# Patient Record
Sex: FEMALE
[De-identification: names, ages, dates, MRNs, and addresses within clinical notes are randomized per-mention and may not be internally consistent; named-entity substitution may affect disease eponyms.]

---

## 2021-08-27 PROBLEM — Z00.00 ENCOUNTER FOR PREVENTIVE HEALTH EXAMINATION: Status: ACTIVE | Noted: 2021-08-27

## 2021-08-31 ENCOUNTER — APPOINTMENT (OUTPATIENT)
Dept: ORTHOPEDIC SURGERY | Facility: CLINIC | Age: 41
End: 2021-08-31
Payer: COMMERCIAL

## 2021-08-31 PROCEDURE — 99203 OFFICE O/P NEW LOW 30 MIN: CPT | Mod: 95

## 2021-08-31 NOTE — REASON FOR VISIT
[Home] : at home, [unfilled] , at the time of the visit. [Medical Office: ([facility])___] : at the medical office located in  [Verbal consent obtained from patient] : the patient, [unfilled] [Initial Visit] : an initial visit for [Wrist Injury] : wrist injury

## 2021-08-31 NOTE — HISTORY OF PRESENT ILLNESS
[Right] : right hand dominant [FreeTextEntry1] : Pt c/o right ulna sided wrist pain.  She had x rays and MRI done at  Radiology.  Pt was no injected.  She tried rest and splinting with minimal relief.  \par \par Patient developed right ulnar-sided wrist pain with tennis approximately 8 weeks ago. He was seen by an outside orthopedist, who diagnosed her with a TFCC tear recommended splinting and activity modifications.\par \par She states that over the past 8 weeks with the splinting and activity modification. Symptoms are 99% improved and she denies any mechanical symptoms.\par \par She has questions about the need for an MRI and the differential diagnosis was. Options.

## 2021-08-31 NOTE — ASSESSMENT
[FreeTextEntry1] : I reviewed the notes from Dr. Gayle , and discussed with her the limited ability to assess things over the phone. She did not have access to the usual communication today.\par \par The risks, benefits, alternatives and associated differential diagnosis was discussed with the patient. Options ranged from conservative care to surgical intervention were reviewed and all questions answered. Patient appeared to have an excellent understanding of the risks as well as differential diagnosis associated with this condition.\par \par Since symptoms are 99% better with splinting. She elected to continue splinting and home program and will follow up with an outside therapist to reduce the risk of reinjury.\par \par If she does reinjure it or the symptoms do not resolve over the next several weeks. She will followup in my office for reevaluation and consideration of an injection versus an MRI.\par \par If the clinical and physical exam is apparent for a TFCC injury. An MRI would most likely not be obtained and we would proceed with a injection.\par \par It is hoped that splinting and activity modifications, resolve the symptoms with the therapist, but if they do not resolve or she reinjures the area. She will return to the office for reevaluation. If symptoms resolve. She can follow on an as-needed basis.

## 2021-11-23 ENCOUNTER — APPOINTMENT (OUTPATIENT)
Dept: ORTHOPEDIC SURGERY | Facility: CLINIC | Age: 41
End: 2021-11-23
Payer: COMMERCIAL

## 2021-11-23 VITALS — BODY MASS INDEX: 24.8 KG/M2 | WEIGHT: 140 LBS | HEIGHT: 63 IN

## 2021-11-23 PROCEDURE — 99213 OFFICE O/P EST LOW 20 MIN: CPT | Mod: 25

## 2021-11-23 PROCEDURE — 73110 X-RAY EXAM OF WRIST: CPT | Mod: 50

## 2021-11-23 PROCEDURE — 20600 DRAIN/INJ JOINT/BURSA W/O US: CPT | Mod: RT

## 2022-03-02 ENCOUNTER — NON-APPOINTMENT (OUTPATIENT)
Age: 42
End: 2022-03-02

## 2022-03-15 ENCOUNTER — APPOINTMENT (OUTPATIENT)
Dept: ORTHOPEDIC SURGERY | Facility: CLINIC | Age: 42
End: 2022-03-15
Payer: COMMERCIAL

## 2022-03-15 DIAGNOSIS — S69.81XD OTHER SPECIFIED INJURIES OF RIGHT WRIST, HAND AND FINGER(S), SUBSEQUENT ENCOUNTER: ICD-10-CM

## 2022-03-15 PROCEDURE — 99442: CPT
